# Patient Record
Sex: FEMALE | Race: WHITE | NOT HISPANIC OR LATINO | URBAN - NONMETROPOLITAN AREA
[De-identification: names, ages, dates, MRNs, and addresses within clinical notes are randomized per-mention and may not be internally consistent; named-entity substitution may affect disease eponyms.]

---

## 2021-10-28 PROBLEM — R13.10 DYSPHAGIA: Chronic | Status: CHRONIC | Noted: 2021-10-28

## 2021-10-28 PROBLEM — K59.09 CHRONIC CONSTIPATION: Chronic | Status: CHRONIC | Noted: 2021-10-28

## 2021-10-28 PROBLEM — K21.9 GASTROESOPHAGEAL REFLUX DISEASE: Chronic | Status: CHRONIC | Noted: 2021-10-28

## 2021-11-22 PROBLEM — I10 PRIMARY HYPERTENSION: Chronic | Status: CHRONIC | Noted: 2021-11-22

## 2021-11-22 PROBLEM — I65.22 STENOSIS OF LEFT CAROTID ARTERY: Chronic | Status: CHRONIC | Noted: 2021-11-22

## 2022-01-20 PROBLEM — I63.232: Chronic | Status: CHRONIC | Noted: 2022-01-20

## 2022-01-20 PROBLEM — I48.91 ATRIAL FIBRILLATION (CMS/HCC): Chronic | Status: CHRONIC | Noted: 2022-01-20

## 2022-11-29 PROBLEM — R06.02 SHORTNESS OF BREATH: Chronic | Status: CHRONIC | Noted: 2022-11-29

## 2022-11-29 PROBLEM — E78.2 MIXED HYPERLIPIDEMIA: Chronic | Status: CHRONIC | Noted: 2022-11-29

## 2023-01-10 PROBLEM — I50.32 CHRONIC DIASTOLIC HEART FAILURE (CMS/HCC): Chronic | Status: CHRONIC | Noted: 2023-01-10

## 2023-01-10 PROBLEM — R07.89 OTHER CHEST PAIN: Status: RESOLVED | Noted: 2022-11-29

## 2023-01-10 PROBLEM — Z98.62: Chronic | Status: CHRONIC | Noted: 2023-01-10

## 2023-05-01 PROBLEM — K62.3 RECTAL PROLAPSE: Chronic | Status: CHRONIC | Noted: 2023-05-01

## 2023-08-01 ENCOUNTER — OFFICE (OUTPATIENT)
Dept: URBAN - NONMETROPOLITAN AREA CLINIC 13 | Facility: CLINIC | Age: 88
End: 2023-08-01
Payer: COMMERCIAL

## 2023-08-01 VITALS
HEART RATE: 75 BPM | SYSTOLIC BLOOD PRESSURE: 158 MMHG | HEIGHT: 65 IN | DIASTOLIC BLOOD PRESSURE: 78 MMHG | SYSTOLIC BLOOD PRESSURE: 167 MMHG | OXYGEN SATURATION: 95 % | WEIGHT: 119 LBS | DIASTOLIC BLOOD PRESSURE: 80 MMHG

## 2023-08-01 DIAGNOSIS — Z98.890 OTHER SPECIFIED POSTPROCEDURAL STATES: ICD-10-CM

## 2023-08-01 DIAGNOSIS — K62.3 RECTAL PROLAPSE: ICD-10-CM

## 2023-08-01 PROCEDURE — 99203 OFFICE O/P NEW LOW 30 MIN: CPT

## 2024-02-07 ENCOUNTER — OFFICE (OUTPATIENT)
Dept: URBAN - NONMETROPOLITAN AREA CLINIC 1 | Facility: CLINIC | Age: 89
End: 2024-02-07

## 2024-02-07 VITALS
WEIGHT: 114 LBS | HEIGHT: 63 IN | DIASTOLIC BLOOD PRESSURE: 66 MMHG | HEART RATE: 78 BPM | SYSTOLIC BLOOD PRESSURE: 126 MMHG

## 2024-02-07 DIAGNOSIS — Z86.73 PERSONAL HISTORY OF TRANSIENT ISCHEMIC ATTACK (TIA), AND CER: ICD-10-CM

## 2024-02-07 DIAGNOSIS — R13.10 DYSPHAGIA, UNSPECIFIED: ICD-10-CM

## 2024-02-07 DIAGNOSIS — K21.9 GASTRO-ESOPHAGEAL REFLUX DISEASE WITHOUT ESOPHAGITIS: ICD-10-CM

## 2024-02-07 DIAGNOSIS — Z79.01 LONG TERM (CURRENT) USE OF ANTICOAGULANTS: ICD-10-CM

## 2024-02-07 PROCEDURE — 99214 OFFICE O/P EST MOD 30 MIN: CPT | Performed by: NURSE PRACTITIONER

## 2024-02-07 NOTE — SERVICENOTES
Risks of procedure explained to patient she wishes to proceed 
We will proceed with esophagram and barium tab to rule out stricture and other abnormalities to explain patient's dysphagia
If workup above is negative we will consider EGD with possible esophageal manometry given signs and symptoms of dysphagia that are worsening despite taking daily pantoprazole and famotidine.
will consider egd w/ dil in future, will need clearance to hold eliquis
Continue current PPI therapy and famotidine as currently prescribed 
Avoid NSAIDs 
Swallowing precautions discussed with the patient thoroughly in office today

## 2024-02-07 NOTE — SERVICEHPINOTES
Patient with a history of CVA, AFib, dysphagia, GERD, HTN, HLD, hypothyroidism presents to the clinic today with son for dysphagia.  She reports dysphagia noted with swallowing foods, liquids, and pills.  She has been able to keep fluids down ok, but foods, even softer foods such as ice cream she gets choked on.  She attempted to eat ice cream today but it got hung and she coughed it back up as well. she has a history of CVA in 2011 in which she underwent barium swallow study that yielded minimal oropharyngeal dysphagia with recommendations regular diet with thin liquids or chopped diet if dysphagia progresses.  Prior to this she has had an esophagram in April of 2021 without stricture and was normal other than a tiny hiatal hernia.  She reports having an EGD done in the past in 2005 by Dr. Prasad that was normal.  GERD is managed on Panto 40mg po qd and Famotidine 20mg po qhs with good control.  She has a history of Afib-anticoagulated Eliquis managed by Dr. Reed, takes She denies coffee ground emesis, bloody stools, melena, abdominal pain, unintentional weight loss, or fever.  br
br 11/2021-Multifocal CVAbr# Left ICA stenosis s/p Left TCARbr

br 11/2021- Barium Swallow Study
brRECOMMENDATIONS. Based on the above findings, pt exhibts minimal oropharyngeal dysphagia. Pt is at low risk for aspiration. ST recommends a regular diet with thin liquids. Downgrade to dysphagia chopped if needed. Adhere to general swallow precautions outlined in this report. Routine oral care. ST to d/c at this time. 
br br  
4/2021 Esophagram
brFINDINGS:brEsophagram was performed with oral barium contrast. The esophagus is brnormal in appearance. No mass or mucosal lesion. No stricture. A br12 millimeter barium pill passed without difficulty. The visualized brportions of the stomach and proximal small bowel are unremarkable. brTiny sliding hiatal hernia.brIMPRESSION:brTiny sliding hiatal hernia. br
br

## 2024-03-05 ENCOUNTER — AMBULATORY SURGICAL CENTER (OUTPATIENT)
Dept: URBAN - NONMETROPOLITAN AREA SURGERY 1 | Facility: SURGERY | Age: 89
End: 2024-03-05
Payer: COMMERCIAL

## 2024-03-05 ENCOUNTER — AMBULATORY SURGICAL CENTER (OUTPATIENT)
Dept: URBAN - NONMETROPOLITAN AREA SURGERY 1 | Facility: SURGERY | Age: 89
End: 2024-03-05
Payer: MEDICARE

## 2024-03-05 VITALS
HEART RATE: 77 BPM | SYSTOLIC BLOOD PRESSURE: 129 MMHG | OXYGEN SATURATION: 96 % | HEART RATE: 77 BPM | DIASTOLIC BLOOD PRESSURE: 72 MMHG | RESPIRATION RATE: 27 BRPM | SYSTOLIC BLOOD PRESSURE: 140 MMHG | HEART RATE: 67 BPM | SYSTOLIC BLOOD PRESSURE: 113 MMHG | WEIGHT: 112 LBS | DIASTOLIC BLOOD PRESSURE: 78 MMHG | RESPIRATION RATE: 21 BRPM | HEART RATE: 72 BPM | DIASTOLIC BLOOD PRESSURE: 78 MMHG | SYSTOLIC BLOOD PRESSURE: 146 MMHG | DIASTOLIC BLOOD PRESSURE: 56 MMHG | OXYGEN SATURATION: 95 % | RESPIRATION RATE: 27 BRPM | SYSTOLIC BLOOD PRESSURE: 113 MMHG | SYSTOLIC BLOOD PRESSURE: 129 MMHG | DIASTOLIC BLOOD PRESSURE: 48 MMHG | RESPIRATION RATE: 27 BRPM | HEIGHT: 66 IN | HEART RATE: 75 BPM | DIASTOLIC BLOOD PRESSURE: 56 MMHG | SYSTOLIC BLOOD PRESSURE: 110 MMHG | RESPIRATION RATE: 23 BRPM | RESPIRATION RATE: 14 BRPM | DIASTOLIC BLOOD PRESSURE: 56 MMHG | DIASTOLIC BLOOD PRESSURE: 72 MMHG | HEART RATE: 67 BPM | DIASTOLIC BLOOD PRESSURE: 48 MMHG | SYSTOLIC BLOOD PRESSURE: 140 MMHG | DIASTOLIC BLOOD PRESSURE: 53 MMHG | HEIGHT: 66 IN | DIASTOLIC BLOOD PRESSURE: 62 MMHG | SYSTOLIC BLOOD PRESSURE: 113 MMHG | HEART RATE: 67 BPM | OXYGEN SATURATION: 96 % | HEART RATE: 72 BPM | OXYGEN SATURATION: 95 % | DIASTOLIC BLOOD PRESSURE: 53 MMHG | OXYGEN SATURATION: 97 % | DIASTOLIC BLOOD PRESSURE: 48 MMHG | DIASTOLIC BLOOD PRESSURE: 78 MMHG | SYSTOLIC BLOOD PRESSURE: 146 MMHG | HEART RATE: 75 BPM | DIASTOLIC BLOOD PRESSURE: 62 MMHG | OXYGEN SATURATION: 97 % | OXYGEN SATURATION: 100 % | DIASTOLIC BLOOD PRESSURE: 72 MMHG | RESPIRATION RATE: 23 BRPM | HEART RATE: 82 BPM | OXYGEN SATURATION: 96 % | SYSTOLIC BLOOD PRESSURE: 110 MMHG | TEMPERATURE: 98.3 F | SYSTOLIC BLOOD PRESSURE: 140 MMHG | TEMPERATURE: 98.3 F | RESPIRATION RATE: 14 BRPM | HEART RATE: 82 BPM | DIASTOLIC BLOOD PRESSURE: 62 MMHG | RESPIRATION RATE: 20 BRPM | HEART RATE: 75 BPM | SYSTOLIC BLOOD PRESSURE: 129 MMHG | HEIGHT: 66 IN | OXYGEN SATURATION: 100 % | HEART RATE: 82 BPM | SYSTOLIC BLOOD PRESSURE: 146 MMHG | OXYGEN SATURATION: 97 % | RESPIRATION RATE: 21 BRPM | SYSTOLIC BLOOD PRESSURE: 110 MMHG | DIASTOLIC BLOOD PRESSURE: 53 MMHG | OXYGEN SATURATION: 95 % | HEART RATE: 77 BPM | HEART RATE: 72 BPM | TEMPERATURE: 98.3 F | RESPIRATION RATE: 23 BRPM | RESPIRATION RATE: 20 BRPM | OXYGEN SATURATION: 100 % | WEIGHT: 112 LBS | RESPIRATION RATE: 20 BRPM | WEIGHT: 112 LBS | RESPIRATION RATE: 14 BRPM | RESPIRATION RATE: 21 BRPM

## 2024-03-05 DIAGNOSIS — R13.10 DYSPHAGIA, UNSPECIFIED: ICD-10-CM

## 2024-03-05 DIAGNOSIS — K21.9 GASTRO-ESOPHAGEAL REFLUX DISEASE WITHOUT ESOPHAGITIS: ICD-10-CM

## 2024-03-05 PROCEDURE — 43249 ESOPH EGD DILATION <30 MM: CPT | Performed by: INTERNAL MEDICINE

## 2024-03-05 RX ADMIN — PROPOFOL 200 MG: 10 INJECTION, EMULSION INTRAVENOUS at 09:41

## 2024-04-22 ENCOUNTER — OFFICE (OUTPATIENT)
Dept: URBAN - NONMETROPOLITAN AREA CLINIC 1 | Facility: CLINIC | Age: 89
End: 2024-04-22
Payer: COMMERCIAL

## 2024-04-22 VITALS
HEIGHT: 65 IN | DIASTOLIC BLOOD PRESSURE: 67 MMHG | WEIGHT: 112 LBS | HEART RATE: 70 BPM | SYSTOLIC BLOOD PRESSURE: 163 MMHG

## 2024-04-22 DIAGNOSIS — K21.9 GASTRO-ESOPHAGEAL REFLUX DISEASE WITHOUT ESOPHAGITIS: ICD-10-CM

## 2024-04-22 DIAGNOSIS — R13.10 DYSPHAGIA, UNSPECIFIED: ICD-10-CM

## 2024-04-22 PROCEDURE — 99213 OFFICE O/P EST LOW 20 MIN: CPT | Performed by: NURSE PRACTITIONER

## 2024-04-22 NOTE — SERVICENOTES
GERD and swallowing precautions discussed with patient thoroughly.  She reports overall feeling much better but still has episodes of dysphagia with meats and breads.  She does not want to eat a pureed diet or soft diet.

## 2024-04-22 NOTE — SERVICEHPINOTES
Patient here for f/u of dysphagia.  She had esophagram w/ barium tab in February 2024 then underwent EGD via Dr. Aragon 3/24 that revealed esophageal dysmotility.  Recommendations were anti-reflux measures, pureed or very soft diet, and upright with eating.  She tells me she feels some better, but she reports some foods still get stuck with swallowing.  She has to spit it up food bolus at times worse with meats and breads.  She states this does not happen all the time but will occur 2-3 times weekly.  She reports her appetite has improved since her last EGD.  She does not want to eat pureed diet.  She drinks boost as well to help with caloric intake.  Denies any melena, n/v/d, abdominal pain, hematochezia, weight loss, or fever.  br

br EGD by Dr. Aragon 3/24
brNormal stomach.br	Normal duodenum.br	Tortuous mid and distal esophagus with a corkscrew appearance. Lining of the esophageal mucosa appeared normal. There was no evidence of reflux induced changes ulcer mass or varices. There may have been a mild Schatzki's ring somewhat difficult to see due to the tortuosity of the esophagus. I did complete dilation over a guidewire with a 45 Lao dilator minimal resistance repeat evaluation without mucosal trauma. .br	4-5 cm sliding hiatal hernia. .brPlan:	brContinue anti-reflux measures. I suspect the majority of the symptoms of dysphagia are due to esophageal dysmotility. Would recommend a pureed or very soft type diet eating slowly chewing well and always sitting upright when eating. Follow up in clinic with GI nurse practitioner in 6-8 weeks.
br br  Esophagram 2/24
brtertiary contraction 3rd of esophagus. mod. large hiatal hernia. EGD w/ poss dil.

## 2024-07-26 ENCOUNTER — OFFICE (OUTPATIENT)
Dept: URBAN - NONMETROPOLITAN AREA CLINIC 1 | Facility: CLINIC | Age: 89
End: 2024-07-26
Payer: COMMERCIAL

## 2024-07-26 VITALS
HEART RATE: 95 BPM | WEIGHT: 110 LBS | HEIGHT: 65 IN | SYSTOLIC BLOOD PRESSURE: 145 MMHG | DIASTOLIC BLOOD PRESSURE: 61 MMHG

## 2024-07-26 DIAGNOSIS — R13.10 DYSPHAGIA, UNSPECIFIED: ICD-10-CM

## 2024-07-26 DIAGNOSIS — K22.2 ESOPHAGEAL OBSTRUCTION: ICD-10-CM

## 2024-07-26 DIAGNOSIS — K44.9 DIAPHRAGMATIC HERNIA WITHOUT OBSTRUCTION OR GANGRENE: ICD-10-CM

## 2024-07-26 PROCEDURE — 99214 OFFICE O/P EST MOD 30 MIN: CPT | Performed by: INTERNAL MEDICINE

## 2024-12-04 ENCOUNTER — AMBULATORY SURGICAL CENTER (OUTPATIENT)
Dept: URBAN - NONMETROPOLITAN AREA SURGERY 1 | Facility: SURGERY | Age: 89
End: 2024-12-04
Payer: COMMERCIAL

## 2024-12-04 VITALS
RESPIRATION RATE: 23 BRPM | OXYGEN SATURATION: 97 % | SYSTOLIC BLOOD PRESSURE: 162 MMHG | DIASTOLIC BLOOD PRESSURE: 74 MMHG | HEART RATE: 80 BPM | OXYGEN SATURATION: 93 % | WEIGHT: 108 LBS | RESPIRATION RATE: 21 BRPM | HEART RATE: 106 BPM | HEIGHT: 65 IN | DIASTOLIC BLOOD PRESSURE: 61 MMHG | DIASTOLIC BLOOD PRESSURE: 67 MMHG | SYSTOLIC BLOOD PRESSURE: 160 MMHG | HEART RATE: 106 BPM | DIASTOLIC BLOOD PRESSURE: 74 MMHG | RESPIRATION RATE: 24 BRPM | OXYGEN SATURATION: 93 % | OXYGEN SATURATION: 90 % | RESPIRATION RATE: 21 BRPM | WEIGHT: 108 LBS | HEIGHT: 65 IN | SYSTOLIC BLOOD PRESSURE: 127 MMHG | HEART RATE: 87 BPM | DIASTOLIC BLOOD PRESSURE: 80 MMHG | DIASTOLIC BLOOD PRESSURE: 67 MMHG | HEART RATE: 83 BPM | DIASTOLIC BLOOD PRESSURE: 80 MMHG | DIASTOLIC BLOOD PRESSURE: 72 MMHG | DIASTOLIC BLOOD PRESSURE: 72 MMHG | RESPIRATION RATE: 24 BRPM | SYSTOLIC BLOOD PRESSURE: 122 MMHG | OXYGEN SATURATION: 90 % | SYSTOLIC BLOOD PRESSURE: 160 MMHG | RESPIRATION RATE: 18 BRPM | SYSTOLIC BLOOD PRESSURE: 162 MMHG | HEART RATE: 80 BPM | SYSTOLIC BLOOD PRESSURE: 148 MMHG | RESPIRATION RATE: 23 BRPM | OXYGEN SATURATION: 97 % | OXYGEN SATURATION: 94 % | SYSTOLIC BLOOD PRESSURE: 127 MMHG | HEART RATE: 83 BPM | TEMPERATURE: 98.3 F | RESPIRATION RATE: 18 BRPM | DIASTOLIC BLOOD PRESSURE: 61 MMHG | SYSTOLIC BLOOD PRESSURE: 122 MMHG | TEMPERATURE: 98.3 F | HEART RATE: 87 BPM | OXYGEN SATURATION: 94 % | SYSTOLIC BLOOD PRESSURE: 148 MMHG

## 2024-12-04 DIAGNOSIS — R13.10 DYSPHAGIA, UNSPECIFIED: ICD-10-CM

## 2024-12-04 DIAGNOSIS — K44.9 DIAPHRAGMATIC HERNIA WITHOUT OBSTRUCTION OR GANGRENE: ICD-10-CM

## 2024-12-04 PROCEDURE — 43235 EGD DIAGNOSTIC BRUSH WASH: CPT | Performed by: INTERNAL MEDICINE

## 2024-12-04 RX ADMIN — PROPOFOL 180 MG: 10 INJECTION, EMULSION INTRAVENOUS at 10:06
